# Patient Record
Sex: MALE | Race: WHITE | NOT HISPANIC OR LATINO | URBAN - METROPOLITAN AREA
[De-identification: names, ages, dates, MRNs, and addresses within clinical notes are randomized per-mention and may not be internally consistent; named-entity substitution may affect disease eponyms.]

---

## 2018-07-05 ENCOUNTER — HOSPITAL ENCOUNTER (OUTPATIENT)
Dept: ONCOLOGY | Facility: HOSPITAL | Age: 63
Discharge: HOME | End: 2018-07-05

## 2020-07-15 ENCOUNTER — ON CAMPUS - OUTPATIENT (AMBULATORY)
Dept: URBAN - METROPOLITAN AREA HOSPITAL 2 | Facility: HOSPITAL | Age: 65
End: 2020-07-15
Payer: MEDICARE

## 2020-07-15 VITALS
SYSTOLIC BLOOD PRESSURE: 109 MMHG | RESPIRATION RATE: 16 BRPM | OXYGEN SATURATION: 98 % | RESPIRATION RATE: 17 BRPM | TEMPERATURE: 97.1 F | HEART RATE: 62 BPM | RESPIRATION RATE: 18 BRPM | SYSTOLIC BLOOD PRESSURE: 102 MMHG | DIASTOLIC BLOOD PRESSURE: 68 MMHG | DIASTOLIC BLOOD PRESSURE: 77 MMHG | SYSTOLIC BLOOD PRESSURE: 115 MMHG | HEART RATE: 60 BPM | OXYGEN SATURATION: 92 % | OXYGEN SATURATION: 99 % | SYSTOLIC BLOOD PRESSURE: 118 MMHG | WEIGHT: 206 LBS | HEIGHT: 74 IN | DIASTOLIC BLOOD PRESSURE: 61 MMHG | HEART RATE: 65 BPM | DIASTOLIC BLOOD PRESSURE: 71 MMHG | SYSTOLIC BLOOD PRESSURE: 112 MMHG | SYSTOLIC BLOOD PRESSURE: 114 MMHG | SYSTOLIC BLOOD PRESSURE: 105 MMHG | DIASTOLIC BLOOD PRESSURE: 75 MMHG | HEART RATE: 63 BPM | HEART RATE: 66 BPM | OXYGEN SATURATION: 96 % | DIASTOLIC BLOOD PRESSURE: 74 MMHG

## 2020-07-15 DIAGNOSIS — Z86.010 PERSONAL HISTORY OF COLONIC POLYPS: ICD-10-CM

## 2020-07-15 DIAGNOSIS — K57.30 DIVERTICULOSIS OF LARGE INTESTINE WITHOUT PERFORATION OR ABS: ICD-10-CM

## 2020-07-15 DIAGNOSIS — K64.2 THIRD DEGREE HEMORRHOIDS: ICD-10-CM

## 2020-07-15 PROCEDURE — G0105 COLORECTAL SCRN; HI RISK IND: HCPCS | Mod: PT | Performed by: INTERNAL MEDICINE

## 2024-05-30 ENCOUNTER — ANESTHESIA EVENT (OUTPATIENT)
Dept: PERIOP | Facility: HOSPITAL | Age: 69
End: 2024-05-30
Payer: MEDICARE

## 2024-05-30 NOTE — CASE MANAGEMENT/SOCIAL WORK
Continued Stay Note  MICA Potts     Patient Name: Tamera Shafer  MRN: [id number]  Today's Date: 5/30/2024    Admit Date: (Not on file)        Discharge Plan       Row Name 05/30/24 1431       Plan    Plan Comments CM attempted to contact patient regarding equipment needs and physical therapy for his surgery with Dr. Nieves on 6/3/24. His VM has not been set up and was unable to leave a message. Equipment needs unsure at this time. Elizabeth with Dr. Nieves's office has arranged for [facility] to follow in the home for physical therapy. CM will follow.                   Discharge Codes    No documentation.                       Nery Patricio RN

## 2024-06-02 NOTE — CASE MANAGEMENT/SOCIAL WORK
Continued Stay Note  MICA Potts     Patient Name: Tamera Shafer  MRN: [id number]  Today's Date: 6/2/2024    Admit Date: (Not on file)    Plan: Home with wife and [facility]  for PT   Discharge Plan       Row Name 06/02/24 1152       Plan    Plan Home with wife and Kort  for PT    Patient/Family in Agreement with Plan yes    Plan Comments CM spoke with patient's wife Marry today regarding equipment and physical therapy needed for after surgery with Dr. Nieves on 6/3/24. Wife states patient has a straight cane and shower chair at home and will need a rolling walker and BSC at discharge and is agreeable for CM to arrange this equipment. CM informed wife that Elizabeth with Dr. Nieves's office has arrange [facility] to follow in the home for physical therapy and she verbalized understanding and is agreeable to this service. She had no other questions at this time. CM will follow.                   Discharge Codes    No documentation.                       Nery Patricio RN

## 2024-06-03 ENCOUNTER — HOSPITAL ENCOUNTER (OUTPATIENT)
Facility: HOSPITAL | Age: 69
Discharge: HOME | End: 2024-06-03
Attending: ORTHOPAEDIC SURGERY | Admitting: ORTHOPAEDIC SURGERY
Payer: MEDICARE

## 2024-06-03 ENCOUNTER — ANESTHESIA (OUTPATIENT)
Dept: PERIOP | Facility: HOSPITAL | Age: 69
End: 2024-06-03
Payer: MEDICARE

## 2024-06-03 ENCOUNTER — APPOINTMENT (OUTPATIENT)
Dept: GENERAL RADIOLOGY | Facility: HOSPITAL | Age: 69
End: 2024-06-03
Payer: MEDICARE

## 2024-06-03 VITALS
WEIGHT: 206.6 LBS | BODY MASS INDEX: 26.53 KG/M2 | HEART RATE: 68 BPM | RESPIRATION RATE: 16 BRPM | DIASTOLIC BLOOD PRESSURE: 64 MMHG | SYSTOLIC BLOOD PRESSURE: 112 MMHG | OXYGEN SATURATION: 97 % | TEMPERATURE: 98.1 F

## 2024-06-03 DIAGNOSIS — M17.11 ARTHRITIS OF RIGHT KNEE: Primary | ICD-10-CM

## 2024-06-03 PROCEDURE — 25010000002 EPINEPHRINE 1 MG/ML SOLUTION 1 ML VIAL: Performed by: ORTHOPAEDIC SURGERY

## 2024-06-03 PROCEDURE — 25010000002 CEFAZOLIN PER 500 MG: Performed by: ORTHOPAEDIC SURGERY

## 2024-06-03 PROCEDURE — 25010000002 BUPIVACAINE (PF) 0.25 % SOLUTION

## 2024-06-03 PROCEDURE — 97161 PT EVAL LOW COMPLEX 20 MIN: CPT

## 2024-06-03 PROCEDURE — 63710000001 POVIDONE-IODINE 10 % SOLUTION 118 ML BOTTLE: Performed by: ORTHOPAEDIC SURGERY

## 2024-06-03 PROCEDURE — 25810000003 LACTATED RINGERS PER 1000 ML: Performed by: NURSE ANESTHETIST, CERTIFIED REGISTERED

## 2024-06-03 PROCEDURE — 25010000002 ROPIVACAINE PER 1 MG: Performed by: ORTHOPAEDIC SURGERY

## 2024-06-03 PROCEDURE — 25010000002 MIDAZOLAM PER 1MG: Performed by: NURSE ANESTHETIST, CERTIFIED REGISTERED

## 2024-06-03 PROCEDURE — 25010000002 PROPOFOL 200 MG/20ML EMULSION: Performed by: NURSE ANESTHETIST, CERTIFIED REGISTERED

## 2024-06-03 PROCEDURE — 25010000002 BUPIVACAINE 0.5 % SOLUTION

## 2024-06-03 PROCEDURE — 73560 X-RAY EXAM OF KNEE 1 OR 2: CPT

## 2024-06-03 PROCEDURE — 25010000002 HEPARIN LOCK FLUSH PER 10 UNITS

## 2024-06-03 PROCEDURE — 25010000002 DEXAMETHASONE PER 1 MG: Performed by: NURSE ANESTHETIST, CERTIFIED REGISTERED

## 2024-06-03 PROCEDURE — 25010000002 PROPOFOL 10 MG/ML EMULSION: Performed by: NURSE ANESTHETIST, CERTIFIED REGISTERED

## 2024-06-03 PROCEDURE — 25010000002 VANCOMYCIN 1 G RECONSTITUTED SOLUTION: Performed by: ORTHOPAEDIC SURGERY

## 2024-06-03 PROCEDURE — A9270 NON-COVERED ITEM OR SERVICE: HCPCS | Performed by: ORTHOPAEDIC SURGERY

## 2024-06-03 PROCEDURE — 25010000002 ONDANSETRON PER 1 MG: Performed by: NURSE ANESTHETIST, CERTIFIED REGISTERED

## 2024-06-03 RX ORDER — BUPIVACAINE HYDROCHLORIDE 5 MG/ML
INJECTION, SOLUTION PERINEURAL
Status: COMPLETED | OUTPATIENT
Start: 2024-06-03 | End: 2024-06-03

## 2024-06-03 RX ORDER — PROPOFOL 10 MG/ML
INJECTION, EMULSION INTRAVENOUS AS NEEDED
Status: DISCONTINUED | OUTPATIENT
Start: 2024-06-03 | End: 2024-06-03 | Stop reason: SURG

## 2024-06-03 RX ORDER — SODIUM CHLORIDE, SODIUM LACTATE, POTASSIUM CHLORIDE, CALCIUM CHLORIDE 600; 310; 30; 20 MG/100ML; MG/100ML; MG/100ML; MG/100ML
100 INJECTION, SOLUTION INTRAVENOUS ONCE
Status: DISCONTINUED | OUTPATIENT
Start: 2024-06-03 | End: 2024-06-03 | Stop reason: HOSPADM

## 2024-06-03 RX ORDER — VANCOMYCIN HYDROCHLORIDE 1 G/20ML
INJECTION, POWDER, LYOPHILIZED, FOR SOLUTION INTRAVENOUS AS NEEDED
Status: DISCONTINUED | OUTPATIENT
Start: 2024-06-03 | End: 2024-06-03 | Stop reason: HOSPADM

## 2024-06-03 RX ORDER — SODIUM CHLORIDE 9 MG/ML
40 INJECTION, SOLUTION INTRAVENOUS AS NEEDED
Status: DISCONTINUED | OUTPATIENT
Start: 2024-06-03 | End: 2024-06-03 | Stop reason: HOSPADM

## 2024-06-03 RX ORDER — TRANEXAMIC ACID 100 MG/ML
INJECTION, SOLUTION INTRAVENOUS AS NEEDED
Status: DISCONTINUED | OUTPATIENT
Start: 2024-06-03 | End: 2024-06-03 | Stop reason: SURG

## 2024-06-03 RX ORDER — DEXMEDETOMIDINE HYDROCHLORIDE 100 UG/ML
INJECTION, SOLUTION INTRAVENOUS
Status: COMPLETED | OUTPATIENT
Start: 2024-06-03 | End: 2024-06-03

## 2024-06-03 RX ORDER — FAMOTIDINE 10 MG/ML
20 INJECTION, SOLUTION INTRAVENOUS
Status: COMPLETED | OUTPATIENT
Start: 2024-06-03 | End: 2024-06-03

## 2024-06-03 RX ORDER — EPHEDRINE SULFATE 50 MG/ML
INJECTION INTRAVENOUS AS NEEDED
Status: DISCONTINUED | OUTPATIENT
Start: 2024-06-03 | End: 2024-06-03 | Stop reason: SURG

## 2024-06-03 RX ORDER — SODIUM CHLORIDE, SODIUM LACTATE, POTASSIUM CHLORIDE, CALCIUM CHLORIDE 600; 310; 30; 20 MG/100ML; MG/100ML; MG/100ML; MG/100ML
9 INJECTION, SOLUTION INTRAVENOUS CONTINUOUS
Status: DISCONTINUED | OUTPATIENT
Start: 2024-06-03 | End: 2024-06-03 | Stop reason: HOSPADM

## 2024-06-03 RX ORDER — LIDOCAINE HYDROCHLORIDE 20 MG/ML
INJECTION, SOLUTION INFILTRATION; PERINEURAL AS NEEDED
Status: DISCONTINUED | OUTPATIENT
Start: 2024-06-03 | End: 2024-06-03 | Stop reason: SURG

## 2024-06-03 RX ORDER — SODIUM CHLORIDE 0.9 % (FLUSH) 0.9 %
10 SYRINGE (ML) INJECTION AS NEEDED
Status: DISCONTINUED | OUTPATIENT
Start: 2024-06-03 | End: 2024-06-03 | Stop reason: HOSPADM

## 2024-06-03 RX ORDER — ONDANSETRON 2 MG/ML
4 INJECTION INTRAMUSCULAR; INTRAVENOUS ONCE AS NEEDED
Status: COMPLETED | OUTPATIENT
Start: 2024-06-03 | End: 2024-06-03

## 2024-06-03 RX ORDER — LIDOCAINE HYDROCHLORIDE 10 MG/ML
0.5 INJECTION, SOLUTION INFILTRATION; PERINEURAL ONCE AS NEEDED
Status: DISCONTINUED | OUTPATIENT
Start: 2024-06-03 | End: 2024-06-03 | Stop reason: HOSPADM

## 2024-06-03 RX ORDER — ONDANSETRON 2 MG/ML
4 INJECTION INTRAMUSCULAR; INTRAVENOUS ONCE AS NEEDED
Status: DISCONTINUED | OUTPATIENT
Start: 2024-06-03 | End: 2024-06-03 | Stop reason: HOSPADM

## 2024-06-03 RX ORDER — OXYCODONE AND ACETAMINOPHEN 7.5; 325 MG/1; MG/1
1 TABLET ORAL EVERY 4 HOURS PRN
Qty: 45 TABLET | Refills: 0 | Status: SHIPPED | OUTPATIENT
Start: 2024-06-03

## 2024-06-03 RX ORDER — SODIUM CHLORIDE 0.9 % (FLUSH) 0.9 %
10 SYRINGE (ML) INJECTION EVERY 12 HOURS SCHEDULED
Status: DISCONTINUED | OUTPATIENT
Start: 2024-06-03 | End: 2024-06-03 | Stop reason: HOSPADM

## 2024-06-03 RX ORDER — BUPIVACAINE HYDROCHLORIDE 2.5 MG/ML
INJECTION, SOLUTION EPIDURAL; INFILTRATION; INTRACAUDAL
Status: COMPLETED | OUTPATIENT
Start: 2024-06-03 | End: 2024-06-03

## 2024-06-03 RX ORDER — HEPARIN SODIUM (PORCINE) LOCK FLUSH IV SOLN 100 UNIT/ML 100 UNIT/ML
5 SOLUTION INTRAVENOUS AS NEEDED
Status: DISCONTINUED | OUTPATIENT
Start: 2024-06-03 | End: 2024-06-03 | Stop reason: HOSPADM

## 2024-06-03 RX ORDER — MAGNESIUM HYDROXIDE 1200 MG/15ML
LIQUID ORAL AS NEEDED
Status: DISCONTINUED | OUTPATIENT
Start: 2024-06-03 | End: 2024-06-03 | Stop reason: HOSPADM

## 2024-06-03 RX ORDER — DEXAMETHASONE SODIUM PHOSPHATE 10 MG/ML
8 INJECTION INTRAMUSCULAR; INTRAVENOUS ONCE AS NEEDED
Status: COMPLETED | OUTPATIENT
Start: 2024-06-03 | End: 2024-06-03

## 2024-06-03 RX ORDER — SODIUM CHLORIDE 0.9 % (FLUSH) 0.9 %
20 SYRINGE (ML) INJECTION AS NEEDED
Status: DISCONTINUED | OUTPATIENT
Start: 2024-06-03 | End: 2024-06-03 | Stop reason: HOSPADM

## 2024-06-03 RX ORDER — TRANEXAMIC ACID 650 MG/1
650 TABLET ORAL 2 TIMES DAILY
Qty: 14 TABLET | Refills: 0 | Status: SHIPPED | OUTPATIENT
Start: 2024-06-03

## 2024-06-03 RX ORDER — MIDAZOLAM HYDROCHLORIDE 2 MG/2ML
0.5 INJECTION, SOLUTION INTRAMUSCULAR; INTRAVENOUS
Status: DISCONTINUED | OUTPATIENT
Start: 2024-06-03 | End: 2024-06-03 | Stop reason: HOSPADM

## 2024-06-03 RX ORDER — ACETAMINOPHEN 500 MG
1000 TABLET ORAL ONCE
Status: COMPLETED | OUTPATIENT
Start: 2024-06-03 | End: 2024-06-03

## 2024-06-03 RX ORDER — GABAPENTIN 300 MG/1
300 CAPSULE ORAL ONCE
Status: COMPLETED | OUTPATIENT
Start: 2024-06-03 | End: 2024-06-03

## 2024-06-03 RX ORDER — ASPIRIN 81 MG/1
81 TABLET ORAL EVERY 12 HOURS SCHEDULED
Status: DISCONTINUED | OUTPATIENT
Start: 2024-06-04 | End: 2024-06-03 | Stop reason: HOSPADM

## 2024-06-03 RX ADMIN — ACETAMINOPHEN 1000 MG: 500 TABLET ORAL at 08:02

## 2024-06-03 RX ADMIN — CEFAZOLIN 2000 MG: 2 INJECTION, POWDER, FOR SOLUTION INTRAVENOUS at 09:54

## 2024-06-03 RX ADMIN — DEXAMETHASONE SODIUM PHOSPHATE 8 MG: 10 INJECTION INTRAMUSCULAR; INTRAVENOUS at 07:58

## 2024-06-03 RX ADMIN — SODIUM CHLORIDE, POTASSIUM CHLORIDE, SODIUM LACTATE AND CALCIUM CHLORIDE 9 ML/HR: 600; 310; 30; 20 INJECTION, SOLUTION INTRAVENOUS at 08:01

## 2024-06-03 RX ADMIN — EPHEDRINE SULFATE 10 MG: 50 INJECTION INTRAVENOUS at 10:17

## 2024-06-03 RX ADMIN — FAMOTIDINE 20 MG: 10 INJECTION, SOLUTION INTRAVENOUS at 07:58

## 2024-06-03 RX ADMIN — BUPIVACAINE HYDROCHLORIDE 2.2 ML: 5 INJECTION, SOLUTION PERINEURAL at 09:18

## 2024-06-03 RX ADMIN — BUPIVACAINE HYDROCHLORIDE 17 ML: 2.5 INJECTION, SOLUTION EPIDURAL; INFILTRATION; INTRACAUDAL at 08:50

## 2024-06-03 RX ADMIN — ONDANSETRON 4 MG: 2 INJECTION INTRAMUSCULAR; INTRAVENOUS at 07:59

## 2024-06-03 RX ADMIN — HEPARIN 500 UNITS: 100 SYRINGE at 16:20

## 2024-06-03 RX ADMIN — GABAPENTIN 300 MG: 300 CAPSULE ORAL at 08:02

## 2024-06-03 RX ADMIN — DEXMEDETOMIDINE 40 MCG: 100 INJECTION, SOLUTION, CONCENTRATE INTRAVENOUS at 08:48

## 2024-06-03 RX ADMIN — MIDAZOLAM HYDROCHLORIDE 0.5 MG: 1 INJECTION, SOLUTION INTRAMUSCULAR; INTRAVENOUS at 08:42

## 2024-06-03 RX ADMIN — PROPOFOL INJECTABLE EMULSION 100 MCG/KG/MIN: 10 INJECTION, EMULSION INTRAVENOUS at 09:48

## 2024-06-03 RX ADMIN — EPHEDRINE SULFATE 5 MG: 50 INJECTION INTRAVENOUS at 10:13

## 2024-06-03 RX ADMIN — BUPIVACAINE HYDROCHLORIDE 15 ML: 2.5 INJECTION, SOLUTION EPIDURAL; INFILTRATION; INTRACAUDAL; PERINEURAL at 08:48

## 2024-06-03 RX ADMIN — EPHEDRINE SULFATE 5 MG: 50 INJECTION INTRAVENOUS at 09:55

## 2024-06-03 RX ADMIN — TRANEXAMIC ACID 1000 MG: 100 INJECTION, SOLUTION INTRAVENOUS at 09:53

## 2024-06-03 RX ADMIN — LIDOCAINE HYDROCHLORIDE 60 MG: 20 INJECTION, SOLUTION INFILTRATION; PERINEURAL at 09:48

## 2024-06-03 RX ADMIN — DEXMEDETOMIDINE HYDROCHLORIDE 20 MCG: 100 INJECTION, SOLUTION INTRAVENOUS at 08:50

## 2024-06-03 RX ADMIN — PROPOFOL 50 MG: 10 INJECTION, EMULSION INTRAVENOUS at 09:48

## 2024-06-03 NOTE — DISCHARGE PLACEMENT REQUEST
"Madina Shafer (68 y.o. Male)       Date of Birth   1955    Social Security Number       [place]   [facility] [place] [facility]    Home Phone   [phone]    MRN   [id number]       Evangel[employer]   None    Marital Status                               Admission Date   6/3/24    Admission Type   Elective    Admitting Provider   Donovan Nieves MD    Attending Provider   Donovan Nieves MD    Department, Room/Bed   [facility] OR, [facility]AG OR/MAIN OR       Discharge Date       Discharge Disposition       Discharge Destination                                 Attending Provider: Donovan Nieves MD    Allergies: Rosuvastatin, Meloxicam    Isolation: None   Infection: None   Code Status: Not on file    Ht: 188 cm (74\")   Wt: 93.7 kg (206 lb 9.6 oz)    Admission Cmt: None   Principal Problem: None                  Active Insurance as of 6/3/2024       Primary Coverage       Payor Plan Insurance Group Employer/Plan Group    HUMANA MEDICARE REPLACEMENT HUMANA MED ADV HMO [id number]       Payor Plan Address Payor Plan Phone Number Payor Plan Fax Number Effective Dates    [address] [address] [phone]  1/1/2023 - None Entered    [facility] [address]         Subscriber Name Subscriber Birth Date Member ID       BOBDARRYLMADINA 1955 [id number]                     Emergency Contacts        (Rel.) Home Phone Work Phone Mobile Phone    MADINAKATHI (Spouse) [phone] -- [phone]                "

## 2024-06-03 NOTE — OP NOTE
Op Note TKA - Medial Congruent - Kinematic Alignment  OPERATIVE REPORT    Facility: 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   Post-operative diagnosis: Same  Procedure performed:Right total knee arthroplasty CPT 98104  Approach: Subvastus    Implants:  Medacta SphereTKA   Femur: 6 CR   Tibial tray: 5     Patella - unresurfaced   Bearing - 12 mm Medial Congruent    Kinematic Alignment TKA    Varus   ACL chronically torn  PCL intact    Distal Femur     Medial - worn 6 mm  Lateral - unworn 8 mm    Posterior femur    Medial - unworn 7 mm  Lateral - unworn 7 mm    Tibia    Medial spine - 9.5 mm  Lateral spine - 9.5 mm    Recut - no    Final check:  Negligible V-V in extension  2-3 mm opening w varus in 15-30 degrees of flexion      Surgeon: Donovan Nieves M.D.   Assistant(s):  1. ANETA Estrella    Anesthesia: Spinal/Adductor Canal Femoral Nerve/IPAC Block  Anesthesiologist: Kobe  Estimated blood loss: 50 milliliters   Drains: none  Specimens: None  Complications: None apparent     Findings: severe knee arthritis            INDICATIONS:  Tamera Shafer is a pleasant 68 y.o. year old who presented to my office with a long history of knee pain. The patient failed conservative therapy and we discussed surgical treatment options including total knee arthroplasty. Prior to surgery we discussed the risks, benefits, alternatives to surgery, and surgical convalescence.  Surgical consent was obtained both in the office, as well as the day of surgery. Risks of the procedure include, but are not limited to, infection, DVT, PE, damage to nerves or blood vessels at the time of surgery, bleeding and blood loss, stiffness/arthrofibrosis, and risk of loosening or failure of the components requiring revision surgery. The patient expressed understanding and wished  to proceed with the surgery. An informed consent form was signed and placed on the chart prior to coming to the Operating Room.       PROCEDURE: The patient was brought to the operating room and once obtaining satisfactory anesthesia was placed in the supine position. The knee was prepped and draped in the usual sterile fashion for a total knee replacement. The leg was exsanguinated with an Esmarch bandage and the pneumatic tourniquet inflated to 250 mm mercury. A surgical timeout was held verifying the site, procedure, and that pre-operative antibiotics had been given.  A longitudinal incision was over the anterior aspect of the knee exposing the extensor mechanism. An arthrotomy was performed and the patella displaced laterally. The gross pathologic findings revealed severe degenerative arthritis.    Subperiosteal dissection was continued around the proximal medial tibia. The necessary soft tissue release was performed to correct the fixed angular deformity. Care was taken to protect and preserve the medial collateral ligament.     The drill was used to open the femoral canal. After over drilling the canal, the intramedullary femoral guide was seated and the distal femur was resected utilizing the worn and unworn paddles. The femoral sizing guide was set at neutral external rotation.  The femur was then sized and the appropriate component selected. The anterior and posterior condyles were then resected with the appropriately sized guide and oscillating saw.  All bone resections were measured and confirmed to be appropriate thicknesses.     Then the extramedullary tibial cutting guide was then placed and the proximal tibia was resected at the appropriate level along the axis of the tibia.    With the knee at 90 degrees of flexion, laminar spacers were placed between the femur and tibia. The remaining anterior cruciate ligament was excised. The posterior cruciate ligament was left intact.  A medial and lateral  meniscectomy were performed. A mixture of 60cc of saline, 0.5% ropivicaine, 10mg Morphine, 30mg toradol, 0.2mg of epinephrine, and 40 mg kenalog were injected into the knee joint capsule posteriorly while the lamina spreaders were in position and in the deep tissues.  The solution without steroid was utilized at the capsulotomy incision sites prior to closure for local anesthesia.     The extension space was confirmed to be appropriate with full extension of the knee with an appropriately sized spacer and to be well balanced with varus and valgus stress.     The flexion and extension gaps were checked with the appropriate sized spacer and noted to be well balanced. At this time, the tibial cut was checked with the spacer block.    The tibial fixation hole was created with the tibial template and broach.     The synovium was excised from around the patella, after which the patella thickness was measured with calipers.  The patella was then prepared if the native thickness allowed resurfacing.  The appropriate sized patellar template was seated and the three fixation holes were created with a drill.      A bone plug was then shaped and place in the opening created for the intramedullary femoral guide.    The trial components were then placed and the knee was found to have proper alignment and was stable through a full range of flexion and extension. The trial components were removed and the bony surfaces were cleaned with pulsatile lavage and an antibiotic solution. The bone was then dried and the permanent components were cemented in a sequential fashion. The tibial component was cemented first.  Set in the proper position and rotation.  The tibial component was impacted into place, it was fully seated and excess cemented was removed.  The femoral component was then cemented in place followed by the patella. Excess cement was removed.     Once the cement was hardened the tibial articular surface was implanted. The  knee was then reduced and found to have good flexion, full extension with good stability and proper alignment. The patella tracked central.    A dilute (0.35%) betadine lavage was placed in the arthrotomy site to soak the components for 3 minutes prior to wound closure. The solution was made by adding 17.5 ml of 10% povidone-iodine in 500 cc of normal saline, resulting in a 0.35% dilution. This solution was then removed from the knee and the knee was copiously irrigated.    The tourniquet was then released and hemostasis was obtained with electrocautery. The knee irrigated with pulsatile lavage and antibiotic solution followed by normal saline.     The arthrotomy was closed with # 0 Vicryl interrupted sutures and #1 stratafix barbed suture. The subcutaneous tissue was closed in layers with # 0 and # 2-0 stratafix barbed sutures. The skin was closed with 3-0 stratfix barbed sutures and dermabond. A sterile compressive dressing was applied. The patient tolerated the procedure well, without complication, and was transferred to the recovery room in a stable condition. The sponge and instrument count was correct.      A surgical first assistant was required and necessary for the completion of this case to aid in manipulation and positioning of the extremity while the surgeon operated.  Their assistance was vital to the safety and success of the procedure.     Deep venous thrombosis prophylaxis will consist of aspirin twice daily according to CHEST guidelines.             ____________________________  Donovan Nieves M.D.

## 2024-06-03 NOTE — PLAN OF CARE
Goal Outcome Evaluation:  Plan of Care Reviewed With: patient, spouse           Outcome Evaluation: Physical therapy evaluation complete.  Patient performs supine to sit with CGA and sit to stand with CGA.  Patient performs gait x40 feet with rolling walker.  Patient with intermittent knee buckling of both right and left knees, requiring mod assist to maintain balance and upright posture.  Patient requires cues for safety with walker and increased base of support.  Further gait distance not attempted due to presence of repeated knee buckling.  Discussed current assistance required with patient, spouse and RN as well as safety concerns for returning home today.  Patient is insistent to return home today and does not appear receptive to staying in facility overnight at this time.  RN notified MD, will continue to follow for therapy needs if patient remains in hospital overnight.      Anticipated Discharge Disposition (PT): home with home health

## 2024-06-03 NOTE — ANESTHESIA PROCEDURE NOTES
Spinal Block    Pre-sedation assessment completed: 6/3/2024 8:42 AM    Patient reassessed immediately prior to procedure    Patient location during procedure: pre-op  Start Time: 6/3/2024 9:15 AM  Stop Time: 6/3/2024 9:18 AM  Indication:at surgeon's request and post-op pain management  Performed By  CRNA/CAA: Tha Bullock, CRNA  Preanesthetic Checklist  Completed: patient identified, IV checked, site marked, risks and benefits discussed, surgical consent, monitors and equipment checked, pre-op evaluation and timeout performed  Spinal Block Prep:  Patient Position:sitting  Sterile Tech:cap, gloves, mask and sterile barriers  Prep:Chloraprep  Patient Monitoring:blood pressure monitoring, continuous pulse oximetry and EKG    Spinal Block Procedure  Approach:midline  Guidance:landmark technique and palpation technique  Location:L3-L4  Needle Type:Sprotte  Needle Gauge:25 G  Placement of Spinal needle event:cerebrospinal fluid aspirated  Paresthesia: no  Fluid Appearance:clear  Medications: bupivacaine (MARCAINE) injection 0.5% - Injection   2.2 mL - 6/3/2024 9:18:00 AM   Post Assessment  Patient Tolerance:patient tolerated the procedure well with no apparent complications  Complications no

## 2024-06-03 NOTE — H&P
Patient ID: Tamera Shafer     Chief Complaint:    Painful right knee    HPI:    Tamera Shafer is a 68 y.o. year old patient with end stage arthritis of the Right knee.  Conservative treatment has failed.  Here today for elective total knee arthroplasty.    Past Medical History:   Diagnosis Date    Arthritis     generalized, especially knee and back    Elevated cholesterol     GERD (gastroesophageal reflux disease)     History of heart attack     Hypertension     Non-small cell lung cancer (NSCLC)      Past Surgical History:   Procedure Laterality Date    COLONOSCOPY      EYE SURGERY Bilateral     tubes placed to help with drainage    KNEE ARTHROSCOPY W/ MENISCAL REPAIR Right     and interior ligament repair    LUNG BIOPSY      TUNNELED VENOUS PORT PLACEMENT Left      Current Facility-Administered Medications   Medication Dose Route Frequency Provider Last Rate Last Admin    acetaminophen (TYLENOL) tablet 1,000 mg  1,000 mg Oral Once Donovan Nieves MD        ceFAZolin 2000 mg IVPB in 100 mL NS (VTB)  2,000 mg Intravenous Once Donovan Nieves MD        dexAMETHasone (DECADRON) injection 8 mg  8 mg Intravenous Once PRN Rosa Barreto CRNA        ethyl alcohol 62 % 2 each  2 Swab Nasal Once Donovan Nieves MD        famotidine (PEPCID) injection 20 mg  20 mg Intravenous 60 Min Pre-Op Rosa Barreto CRNA        gabapentin (NEURONTIN) capsule 300 mg  300 mg Oral Once Donovan Nieves MD        lactated ringers infusion  9 mL/hr Intravenous Continuous Rosa Barreto CRNA        lidocaine (XYLOCAINE) 1 % injection 0.5 mL  0.5 mL Intradermal Once PRN Rosa Barreto CRNA        Midazolam HCl (PF) (VERSED) injection 0.5 mg  0.5 mg Intravenous Q10 Min PRN Rosa Barreto CRNA        ondansetron (ZOFRAN) injection 4 mg  4 mg Intravenous Once PRN Rosa Barreto CRNA        ropivacaine 0.5 % 30 mL, EPINEPHrine 0.2 mg solution   Intra-articular Once Donovan Nieves MD        sodium  chloride 0.9 % flush 10 mL  10 mL Intravenous Q12H Rosa Barreto CRNA        sodium chloride 0.9 % flush 10 mL  10 mL Intravenous PRN Rosa Barreto CRNA        sodium chloride 0.9 % infusion 40 mL  40 mL Intravenous PRN Rosa Barreto CRNA         Social History     Tobacco Use    Smoking status: Former     Current packs/day: 0.00     Types: Cigarettes     Quit date: 1984     Years since quittin.0    Smokeless tobacco: Not on file   Substance Use Topics    Alcohol use: Not Currently     Family History   Problem Relation Age of Onset    Malig Hyperthermia Neg Hx        Allergies   Allergen Reactions    Rosuvastatin Other (See Comments)     Muscle pain - d/c by MD    Meloxicam Unknown - Low Severity and Rash     Other reaction(s): Rash, Rash     Immunization History   Administered Date(s) Administered    COVID-19 (PFIZER) BIVALENT 12+YRS 2022    COVID-19 (PFIZER) Purple Cap Monovalent 2021, 2021, 2021    COVID-19 F23 (PFIZER) 12YRS+ (COMIRNATY) 2024    Covid-19 (Pfizer) Gray Cap Monovalent 2022    Pneumococcal Conjugate 13-Valent (PCV13) 2019    Pneumococcal Polysaccharide (PPSV23) 2020    Shingrix 2019, 2019    Tdap 2022     Vitals:    24 0704   Temp: 98.3 °F (36.8 °C)         ROS:    All other pertinent positives and negatives as noted above in HPI.    Physical Exam:     Alert at time of exam  Emotional Behavior - stable and appropriate  Gen- healthy appearing, in no acute distress  HEENT- Normocephalic, atraumatic  Extremity- no gross deformity, see office notes, no changes  Neuro- motor and sensory grossly intact, moving all extremities  Pulses- Present bilateral lower extremities, toes pink, BCR    Painful and restricted rom - right knee      Diagnostic Studies:     End stage arthritis of right knee.      Assessment:     right knee arthritis    Plan:      Elective right TKA today      CONSENT TO PROCEDURE/SEDATION  *   Information provided regarding procedure: Yes   *  Risk/Benefits Discussed: Yes   *  Alternative /Recovery Discussed: Yes   *  Need for Blood Discussed: Yes   *  Patient /Parent Consents to Planned Procedure/Sedation and accurately recounts discussion: Yes     The spectrum of treatment options were discussed with the patient in detail including both the nonoperative and operative treatment modalities and their respective risks and benefits.  After thorough discussion, the patient has elected to undergo surgical treatment.  The details of the surgical procedure were explained including the location of probable incisions and a description of the likely implants to be used.  Models and diagrams were used as educational resources. The patient understands the likely convalescence after surgery, as well as the rehabilitation required.  We thoroughly discussed the risks, benefits, and alternatives to surgery.  The risks include but are not limited to the risk of infection, joint stiffness, blood clots (including DVT and/or pulmonary embolus along with the risk of death), neurologic and/or vascular injury, fracture, dislocation, nonunion, malunion, need for further surgery including hardware failure requiring revision, and continued pain.  It was explained that if tissue has been repaired or reconstructed, there is also a chance of failure which may require further management.  In addition, we have discussed the risks, including the risk of disease transmission, associated with any allograft tissue which may be utilized.  Following the completion of the discussion, the patient expressed understanding of this planned course of care, all their questions were answered and consent will be obtained preoperatively.

## 2024-06-03 NOTE — CASE MANAGEMENT/SOCIAL WORK
Continued Stay Note  MICA Potts     Patient Name: Tamera Shafer  MRN: [id number]  Today's Date: 6/3/2024    Admit Date: 6/3/2024    Plan: Home with wife and [facility]ort  for PT   Discharge Plan       Row Name 06/03/24 0902       Plan    Plan Comments Spoke with Robbie/Nancy regarding need for RW/BSC post-op, referral given. Return call from Robbie/Nancy who states they are out of network with patient insurance. Spoke with Mei - referral given and informed patient to have surgery and admit to med/surg overnight. Referral placed via epic. Azul/Leela states she will deliver RW/BSC to bedside this afternoon. CM will continue to follow.                   Discharge Codes    No documentation.                       Salomon Rosenthal RN

## 2024-06-03 NOTE — CASE MANAGEMENT/SOCIAL WORK
Rec'd call from Leilani/post-op who states patient is no longer a surgery admit and will dc home from the post op area - requesting RW/BSC.  Call to Em/Leela to update, she states to have patient call [facility] when he arrives home and they will deliver DME to the home. Leilani/post-op updated, Leela # listed on AVS.

## 2024-06-03 NOTE — PERIOPERATIVE NURSING NOTE
Angelo GONZALEZ de-accessed pts port with heparin ordered, pt tolerated well and stable for discharge.

## 2024-06-03 NOTE — ANESTHESIA PROCEDURE NOTES
Peripheral Block    Pre-sedation assessment completed: 6/3/2024 8:42 AM    Patient reassessed immediately prior to procedure    Patient location during procedure: pre-op  Start time: 6/3/2024 8:49 AM  Stop time: 6/3/2024 8:50 AM  Reason for block: at surgeon's request and post-op pain management  Performed by  CRNA/CAA: Tha Bullock CRNA  Preanesthetic Checklist  Completed: patient identified, IV checked, site marked, risks and benefits discussed, surgical consent, monitors and equipment checked, pre-op evaluation and timeout performed  Prep:  Pt Position: supine  Sterile barriers:cap, gloves, mask and washed/disinfected hands  Prep: ChloraPrep  Patient monitoring: blood pressure monitoring, continuous pulse oximetry and EKG  Procedure    Sedation: yes  Performed under: local infiltration  Guidance:ultrasound guided    ULTRASOUND INTERPRETATION.  Using ultrasound guidance a 21 G gauge needle was placed in close proximity to the nerve, at which point, under ultrasound guidance anesthetic was injected in the area of the nerve and spread of the anesthesia was seen on ultrasound in close proximity thereto.  There were no abnormalities seen on ultrasound; a digital image was taken; and the patient tolerated the procedure with no complications. Images:still images obtained, printed/placed on chart    Laterality:right  Block Type:iPack  Injection Technique:single-shot  Needle Type:echogenic  Needle Gauge:21 G  Resistance on Injection: none    Medications Used: bupivacaine PF (MARCAINE) injection 0.25% - Injection   17 mL - 6/3/2024 8:50:00 AM  dexmedetomidine HCl (PRECEDEX) injection - Intravenous   20 mcg - 6/3/2024 8:50:00 AM      Post Assessment  Injection Assessment: negative aspiration for heme, no paresthesia on injection and incremental injection  Patient Tolerance:comfortable throughout block  Complications:no

## 2024-06-03 NOTE — ANESTHESIA PROCEDURE NOTES
Peripheral Block    Pre-sedation assessment completed: 6/3/2024 8:42 AM    Patient reassessed immediately prior to procedure    Patient location during procedure: pre-op  Start time: 6/3/2024 8:45 AM  Stop time: 6/3/2024 8:48 AM  Reason for block: at surgeon's request and post-op pain management  Performed by  CRNA/CAA: Tha Bullock CRNA  Preanesthetic Checklist  Completed: patient identified, IV checked, site marked, risks and benefits discussed, surgical consent, monitors and equipment checked, pre-op evaluation and timeout performed  Prep:  Pt Position: supine  Sterile barriers:cap, gloves, mask and washed/disinfected hands  Prep: ChloraPrep  Patient monitoring: blood pressure monitoring, continuous pulse oximetry and EKG  Procedure    Sedation: yes  Performed under: local infiltration  Guidance:ultrasound guided    ULTRASOUND INTERPRETATION.  Using ultrasound guidance a 21 G gauge needle was placed in close proximity to the nerve, at which point, under ultrasound guidance anesthetic was injected in the area of the nerve and spread of the anesthesia was seen on ultrasound in close proximity thereto.  There were no abnormalities seen on ultrasound; a digital image was taken; and the patient tolerated the procedure with no complications. Images:still images obtained, printed/placed on chart    Laterality:right  Block Type:adductor canal block  Injection Technique:single-shot  Needle Type:echogenic  Needle Gauge:21 G  Resistance on Injection: none    Medications Used: bupivacaine PF (MARCAINE) injection 0.25% - Injection   15 mL - 6/3/2024 8:48:00 AM  dexmedetomidine HCl (PRECEDEX) injection - Intravenous   40 mcg - 6/3/2024 8:48:00 AM      Post Assessment  Injection Assessment: negative aspiration for heme, no paresthesia on injection and incremental injection  Patient Tolerance:comfortable throughout block  Complications:no

## 2024-06-03 NOTE — ANESTHESIA PREPROCEDURE EVALUATION
Anesthesia Evaluation     Patient summary reviewed and Nursing notes reviewed   no history of anesthetic complications:   NPO Solid Status: > 8 hours  NPO Liquid Status: > 8 hours           Airway   Mallampati: II  TM distance: <3 FB  Neck ROM: limited  Difficult intubation highly probable  Dental    (+) implants    Comment: Upper and lower complete implants      Pulmonary - normal exam   (+) a smoker (smoked 40 years quit 2006) Former, Abstained day of surgery, cigarettes, lung cancer (keytruda (biologic) and zometta (bone strengthening)),  (-) shortness of breath, recent URI, sleep apnea  Cardiovascular   Exercise tolerance: good (4-7 METS)    ECG reviewed  PT is on anticoagulation therapy  Rhythm: irregular  Rate: normal    (+) hypertension well controlled less than 2 medications, past MI (unknown date per echo 3/2024, apical ischemia)  6-12 months, hyperlipidemia  (-) pacemaker, angina, DVT    ROS comment: 5/30/24 12 lead EKG  LBB  Marked sinus arrhythmia   3/2024 ECHO LVEF 40%    Neuro/Psych  (+) weakness (right arm s/t pinched nerve in neck), numbness (right index finger)  (-) syncope  GI/Hepatic/Renal/Endo    (+) GERD well controlled  (-) diabetes    Musculoskeletal     (+) back pain (lower back 2/10), neck pain (herniated disk), neck stiffness, radiculopathy Right upper extremity  Abdominal  - normal exam    Abdomen: soft.   Substance History - negative use  (-) alcohol use, drug use     OB/GYN          Other   arthritis,   history of cancer active    (-) chronic steroid use  ROS/Med Hx Other: Cardiac clearance 4/10/24 moderate non-modifiable surgical risk                Anesthesia Plan    ASA 3     MAC, regional and spinal     intravenous induction     Anesthetic plan, risks, benefits, and alternatives have been provided, discussed and informed consent has been obtained with: patient and spouse/significant other.    Use of blood products discussed with patient and spouse/significant other  Consented to  blood products.    Plan discussed with CRNA.    CODE STATUS:

## 2024-06-03 NOTE — ANESTHESIA POSTPROCEDURE EVALUATION
Patient: Tamera Shafer    Procedure Summary       Date: 06/03/24 Room / Location:  [facility]    Anesthesia Start: 0944 Anesthesia Stop: 1136    Procedure: RIGHT TOTAL KNEE REPLACEMENT (Right: Knee) Diagnosis: (M17.11)    Surgeons: Donovan Nieves MD Provider: Jaylon Nance CRNA    Anesthesia Type: MAC, regional, spinal ASA Status: 3            Anesthesia Type: MAC, regional, spinal    Vitals  Vitals Value Taken Time   /64 06/03/24 1456   Temp 98.1 °F (36.7 °C) 06/03/24 1131   Pulse 68 06/03/24 1456   Resp 16 06/03/24 1456   SpO2 97 % 06/03/24 1456           Post Anesthesia Care and Evaluation    Patient location during evaluation: PHASE II  Patient participation: complete - patient participated  Level of consciousness: awake and alert  Pain score: 2  Pain management: adequate    Airway patency: patent  Anesthetic complications: No anesthetic complications  PONV Status: none  Cardiovascular status: acceptable  Respiratory status: acceptable  Hydration status: acceptable

## 2024-06-03 NOTE — THERAPY EVALUATION
Patient Name: Tamera Shafer  : 1955    MRN: [id number]                              Today's Date: 6/3/2024       Admit Date: 6/3/2024    Visit Dx:     ICD-10-CM ICD-9-CM   1. Arthritis of right knee  M17.11 716.96     Patient Active Problem List   Diagnosis    Arthritis of right knee     Past Medical History:   Diagnosis Date    Arthritis     generalized, especially knee and back    Elevated cholesterol     GERD (gastroesophageal reflux disease)     History of heart attack     Hypertension     Non-small cell lung cancer (NSCLC)      Past Surgical History:   Procedure Laterality Date    COLONOSCOPY      EYE SURGERY Bilateral     tubes placed to help with drainage    KNEE ARTHROSCOPY W/ MENISCAL REPAIR Right     and interior ligament repair    LUNG BIOPSY      TUNNELED VENOUS PORT PLACEMENT Left       General Information       Row Name 24 1429          Physical Therapy Time and Intention    Document Type evaluation  -     Mode of Treatment physical therapy  -       Row Name 24 1429          General Information    Patient Profile Reviewed yes  pt s/p right TKA, WBAT  -     Prior Level of Function independent:;all household mobility  -     Existing Precautions/Restrictions fall  -     Barriers to Rehab none identified  -       Row Name 24 1429          Living Environment    People in Home spouse  -       Row Name 24 1429          Home Main Entrance    Number of Stairs, Main Entrance four  -     Stair Railings, Main Entrance --  1 handrail  -       Row Name 24 1429          Cognition    Orientation Status (Cognition) oriented x 3  -       Row Name 24 1429          Safety Issues, Functional Mobility    Safety Issues Affecting Function (Mobility) insight into deficits/self-awareness;awareness of need for assistance;judgment  -     Impairments Affecting Function (Mobility) balance  -     Comment, Safety Issues/Impairments (Mobility) pt with bilateral  knee buckling intermittently throughout gait activity, requires assistance to maintain balance  -               User Key  (r) = Recorded By, (t) = Taken By, (c) = Cosigned By      Initials Name Provider Type     Regla Saunders, PT Physical Therapist                   Mobility       Row Name 06/03/24 1429          Bed Mobility    Bed Mobility supine-sit;sit-supine  -     Supine-Sit [place] (Bed Mobility) contact guard;verbal cues  -     Sit-Supine [place] (Bed Mobility) contact guard;verbal cues  -     Assistive Device (Bed Mobility) head of bed elevated  -       Row Name 06/03/24 1429          Sit-Stand Transfer    Sit-Stand [place] (Transfers) contact guard;verbal cues  -     Assistive Device (Sit-Stand Transfers) walker, front-wheeled  -       Row Name 06/03/24 1429          Gait/Stairs (Locomotion)    [place] Level (Gait) minimum assist (75% patient effort);moderate assist (50% patient effort);verbal cues  -     Assistive Device (Gait) walker, front-wheeled  -     Patient was able to Ambulate yes  -     Distance in Feet (Gait) 40  -JW     Deviations/Abnormal Patterns (Gait) base of support, narrow;kelsea decreased  -     Bilateral Gait Deviations forward flexed posture  -     Left Sided Gait Deviations knee buckling, left side  -JW     Right Sided Gait Deviations knee buckling, right side  -JW     Comment, (Gait/Stairs) pt with intermittent knee buckling bilaterally, requires mod assist to prevent balance loss and maintain upright posture.  pt requires repeated verbal cues for proper distance from walker and safety throughout mobility  -       Row Name 06/03/24 1429          Mobility    Extremity Weight-bearing Status right lower extremity  -     Right Lower Extremity (Weight-bearing Status) weight-bearing as tolerated (WBAT)  -               User Key  (r) = Recorded By, (t) = Taken By, (c) = Cosigned By      Initials Name Provider Type    Regla Wakefield,  PT Physical Therapist                   Obj/Interventions       [facility] Name 06/03/24 1429          Range of Motion Comprehensive    Comment, General Range of Motion left LE functional, right LE not formally tested  -[facility] Name 06/03/24 1429          Strength Comprehensive (MMT)    Comment, General Manual Muscle Testing (MMT) Assessment left LE 4/5, right LE not formally tested  -[facility] Name 06/03/24 1429          Balance    Comment, Balance min/mod assist for dynamic standing balance with walker  -[facility] Name 06/03/24 1429          Sensory Assessment (Somatosensory)    Sensory Assessment (Somatosensory) other (see comments)  pt reports some numbness in right LE  -               User Key  (r) = Recorded By, (t) = Taken By, (c) = Cosigned By      Initials Name Provider Type    Regla Wakefield, PT Physical Therapist                   Goals/Plan       [facility] Name 06/03/24 1429          Bed Mobility Goal 1 (PT)    Activity/Assistive Device (Bed Mobility Goal 1, PT) bed mobility activities, all  -JW     [place] Level/Cues Needed (Bed Mobility Goal 1, PT) supervision required  -JW     Time Frame (Bed Mobility Goal 1, PT) 2 days  -JW     Progress/Outcomes (Bed Mobility Goal 1, PT) new goal  -[facility] Name 06/03/24 1429          Transfer Goal 1 (PT)    Activity/Assistive Device (Transfer Goal 1, PT) transfers, all  -JW     [place] Level/Cues Needed (Transfer Goal 1, PT) supervision required  -JW     Time Frame (Transfer Goal 1, PT) 2 days  -JW     Progress/Outcome (Transfer Goal 1, PT) new goal  -[facility] Name 06/03/24 1429          Gait Training Goal 1 (PT)    Activity/Assistive Device (Gait Training Goal 1, PT) gait (walking locomotion);assistive device use  -JW     [place] Level (Gait Training Goal 1, PT) supervision required  -JW     Distance (Gait Training Goal 1, PT) 150  -JW     Time Frame (Gait Training Goal 1, PT) 2 days  -JW     Progress/Outcome (Gait Training Goal 1, PT)  "new goal  -       Row Name 06/03/24 1429          Therapy Assessment/Plan (PT)    Planned Therapy Interventions (PT) balance training;bed mobility training;gait training;home exercise program;patient/family education;strengthening;transfer training  -               User Key  (r) = Recorded By, (t) = Taken By, (c) = Cosigned By      Initials Name Provider Type     Regla Saunders, PT Physical Therapist                   Clinical Impression       Row Name 06/03/24 1429          Pain    Pretreatment Pain Rating 0/10 - no pain  -     Posttreatment Pain Rating 0/10 - no pain  -       Row Name 06/03/24 1429          Plan of Care Review    Plan of Care Reviewed With patient;spouse  -     Outcome Evaluation Physical therapy evaluation complete.  Patient performs supine to sit with CGA and sit to stand with CGA.  Patient performs gait x40 feet with rolling walker.  Patient with intermittent knee buckling of both right and left knees, requiring mod assist to maintain balance and upright posture.  Patient requires cues for safety with walker and increased base of support.  Further gait distance not attempted due to presence of repeated knee buckling.  Discussed current assistance required with patient, spouse and RN as well as safety concerns for returning home today.  Patient is insistent to return home today and does not appear receptive to staying in facility overnight at this time.  RN notified MD, will continue to follow for therapy needs if patient remains in hospital overnight.  -       Row Name 06/03/24 1429          Therapy Assessment/Plan (PT)    Patient/Family Therapy Goals Statement (PT) \"go home today\"  -     Rehab Potential (PT) fair, will monitor progress closely  -     Criteria for Skilled Interventions Met (PT) yes;skilled treatment is necessary  -     Therapy Frequency (PT) daily  -     Predicted Duration of Therapy Intervention (PT) 2 days  -       Row Name 06/03/24 1429          " Positioning and Restraints    Pre-Treatment Position in bed  -JW     Post Treatment Position wheelchair  -JW     In Wheelchair sitting;call light within reach;encouraged to call for assist;with family/caregiver;with nsg  -JW               User Key  (r) = Recorded By, (t) = Taken By, (c) = Cosigned By      Initials Name Provider Type    Regla Wakefield PT Physical Therapist                   Outcome Measures       Row Name 06/03/24 1429          How much help from another person do you currently need...    Turning from your back to your side while in flat bed without using bedrails? 4  -JW     Moving from lying on back to sitting on the side of a flat bed without bedrails? 3  -JW     Moving to and from a bed to a chair (including a wheelchair)? 3  -JW     Standing up from a chair using your arms (e.g., wheelchair, bedside chair)? 3  -JW     Climbing 3-5 steps with a railing? 2  -JW     To walk in hospital room? 2  -JW     AM-PAC 6 Clicks Score (PT) 17  -JW     Highest Level of Mobility Goal 5 --> Static standing  -       Row Name 06/03/24 1429          Functional Assessment    Outcome Measure Options AM-PAC 6 Clicks Basic Mobility (PT)  -               User Key  (r) = Recorded By, (t) = Taken By, (c) = Cosigned By      Initials Name Provider Type    Regla Wakefield PT Physical Therapist                                 Physical Therapy Education       Title: PT OT SLP Therapies (In Progress)       Topic: Physical Therapy (In Progress)       Point: Mobility training (Done)       Learning Progress Summary             Patient Acceptance, E,TB, VU by  at 6/3/2024 9745                         Point: Home exercise program (Not Started)       Learner Progress:  Not documented in this visit.                              User Key       Initials Effective Dates Name Provider Type Rocio     06/16/21 -  Regla Saunders PT Physical Therapist PT                  PT Recommendation and Plan  Planned Therapy  Interventions (PT): balance training, bed mobility training, gait training, home exercise program, patient/family education, strengthening, transfer training  Plan of Care Reviewed With: patient, spouse  Outcome Evaluation: Physical therapy evaluation complete.  Patient performs supine to sit with CGA and sit to stand with CGA.  Patient performs gait x40 feet with rolling walker.  Patient with intermittent knee buckling of both right and left knees, requiring mod assist to maintain balance and upright posture.  Patient requires cues for safety with walker and increased base of support.  Further gait distance not attempted due to presence of repeated knee buckling.  Discussed current assistance required with patient, spouse and RN as well as safety concerns for returning home today.  Patient is insistent to return home today and does not appear receptive to staying in facility overnight at this time.  RN notified MD, will continue to follow for therapy needs if patient remains in hospital overnight.     Time Calculation:   PT Evaluation Complexity  History, PT Evaluation Complexity: 1-2 personal factors and/or comorbidities  Examination of Body Systems (PT Eval Complexity): 1-2 elements  Clinical Presentation (PT Evaluation Complexity): stable  Clinical Decision Making (PT Evaluation Complexity): low complexity  Overall Complexity (PT Evaluation Complexity): low complexity     PT Charges       Row Name 06/03/24 1535             Time Calculation    Start Time 1429  -      Stop Time 1452  -      Time Calculation (min) 23 min  -PAPO      PT Received On 06/03/24  -      PT - Next Appointment 06/04/24  -                User Key  (r) = Recorded By, (t) = Taken By, (c) = Cosigned By      Initials Name Provider Type    Regla Wakefield, PT Physical Therapist                  Therapy Charges for Today       Code Description Service Date Service Provider Modifiers Qty    [id number]  PT EVAL LOW COMPLEXITY 2 6/3/2024  Regla Saunders, PT GP 1            PT G-Codes  Outcome Measure Options: AM-PAC 6 Clicks Basic Mobility (PT)  AM-PAC 6 Clicks Score (PT): 17  PT Discharge Summary  Anticipated Discharge Disposition (PT): home with home health    Regla Saunders, PT  6/3/2024